# Patient Record
Sex: FEMALE | Race: WHITE | URBAN - METROPOLITAN AREA
[De-identification: names, ages, dates, MRNs, and addresses within clinical notes are randomized per-mention and may not be internally consistent; named-entity substitution may affect disease eponyms.]

---

## 2017-04-20 ENCOUNTER — OFFICE (OUTPATIENT)
Dept: URBAN - METROPOLITAN AREA CLINIC 64 | Facility: CLINIC | Age: 22
End: 2017-04-20

## 2017-04-20 VITALS
WEIGHT: 197 LBS | DIASTOLIC BLOOD PRESSURE: 81 MMHG | HEIGHT: 65 IN | HEART RATE: 73 BPM | SYSTOLIC BLOOD PRESSURE: 129 MMHG

## 2017-04-20 DIAGNOSIS — R11.0 NAUSEA: ICD-10-CM

## 2017-04-20 DIAGNOSIS — R10.13 EPIGASTRIC PAIN: ICD-10-CM

## 2017-04-20 LAB
AMYLASE, SERUM: 79 U/L (ref 31–124)
HEPATIC FUNCTION PANEL (7): ALBUMIN, SERUM: 4.7 G/DL (ref 3.5–5.5)
HEPATIC FUNCTION PANEL (7): ALKALINE PHOSPHATASE, S: 49 IU/L (ref 39–117)
HEPATIC FUNCTION PANEL (7): ALT (SGPT): 12 IU/L (ref 0–32)
HEPATIC FUNCTION PANEL (7): AST (SGOT): 19 IU/L (ref 0–40)
HEPATIC FUNCTION PANEL (7): BILIRUBIN, DIRECT: 0.11 MG/DL (ref 0–0.4)
HEPATIC FUNCTION PANEL (7): BILIRUBIN, TOTAL: 0.4 MG/DL (ref 0–1.2)
HEPATIC FUNCTION PANEL (7): PROTEIN, TOTAL, SERUM: 7.2 G/DL (ref 6–8.5)
LIPASE, SERUM: 37 U/L (ref 0–59)

## 2017-04-20 PROCEDURE — 99242 OFF/OP CONSLTJ NEW/EST SF 20: CPT | Performed by: INTERNAL MEDICINE

## 2017-04-20 NOTE — SERVICEHPINOTES
rectal presents today in the company of her mother for evaluation of some persistent nausea and burning epigastric and right upper quadrant pain.  About one month ago she experienced the acute onset of low back pain with the feeling of constipation which was followed by protracted nausea and vomiting requiring an emergency room visit.  She had eaten out at a [employer] restaurant the day before and it was presumed that she had some infectious foodborne illness.  Since then, however, she has continued to have a decrease in appetite with postprandial nausea intermittently but no further vomiting.  Her gallbladder was evaluated in both ultrasound and HIDA scan were normal with a 77% ejection fraction and no stones.  She does not complain of any constipation and in fact, her normal bowel pattern is a single stool every 3 days and now she has a single formed stool every 2 days.  There is no blood in the stool and no melena.  She has no history of ulcer disease.  She denies early satiety and dysphagia.  She takes no aspirin or NSAIDs.  She's never had pancreatitis.  We discussed the possibility of postinfectious gastroparesis with conservative management.